# Patient Record
Sex: FEMALE | URBAN - METROPOLITAN AREA
[De-identification: names, ages, dates, MRNs, and addresses within clinical notes are randomized per-mention and may not be internally consistent; named-entity substitution may affect disease eponyms.]

---

## 2018-05-10 ENCOUNTER — APPOINTMENT (RX ONLY)
Dept: URBAN - METROPOLITAN AREA CLINIC 17 | Facility: CLINIC | Age: 83
Setting detail: DERMATOLOGY
End: 2018-05-10

## 2018-05-10 DIAGNOSIS — L03.01 CELLULITIS OF FINGER: ICD-10-CM

## 2018-05-10 PROBLEM — L03.012 CELLULITIS OF LEFT FINGER: Status: ACTIVE | Noted: 2018-05-10

## 2018-05-10 PROCEDURE — ? COUNSELING

## 2018-05-10 PROCEDURE — 11900 INJECT SKIN LESIONS </W 7: CPT

## 2018-05-10 PROCEDURE — ? ADDITIONAL NOTES

## 2018-05-10 PROCEDURE — ? PRESCRIPTION

## 2018-05-10 PROCEDURE — ? INTRALESIONAL KENALOG

## 2018-05-10 RX ORDER — CEPHALEXIN 500 MG/1
TABLET ORAL TID
Qty: 30 | Refills: 0 | COMMUNITY
Start: 2018-05-10

## 2018-05-10 RX ADMIN — CEPHALEXIN: 500 TABLET ORAL at 17:03

## 2018-05-10 ASSESSMENT — LOCATION SIMPLE DESCRIPTION DERM: LOCATION SIMPLE: LEFT RING FINGER

## 2018-05-10 ASSESSMENT — LOCATION DETAILED DESCRIPTION DERM: LOCATION DETAILED: LEFT DISTAL PALMAR RING FINGER

## 2018-05-10 ASSESSMENT — LOCATION ZONE DERM: LOCATION ZONE: FINGER

## 2018-05-10 NOTE — PROCEDURE: INTRALESIONAL KENALOG
Total Volume Injected (Ccs- Only Use Numbers And Decimals): .3
Detail Level: Detailed
Concentration Of Solution Injected (Mg/Ml): 5.0
Consent: The risks of atrophy, color change, striae were reviewed with the patient.
Kenalog Preparation: Kenalog
Medical Necessity Clause: This procedure was medically necessary because the lesions that were treated were: [facility] l[facility] care
X Size Of Lesion In Cm (Optional): 0
Include Z78.9 (Other Specified Conditions Influencing Health Status) As An Associated Diagnosis?: No

## 2018-05-22 ENCOUNTER — APPOINTMENT (RX ONLY)
Dept: URBAN - METROPOLITAN AREA CLINIC 17 | Facility: CLINIC | Age: 83
Setting detail: DERMATOLOGY
End: 2018-05-22

## 2018-05-22 DIAGNOSIS — L03.01 CELLULITIS OF FINGER: ICD-10-CM

## 2018-05-22 PROBLEM — L03.012 CELLULITIS OF LEFT FINGER: Status: ACTIVE | Noted: 2018-05-22

## 2018-05-22 PROCEDURE — 10060 I&D ABSCESS SIMPLE/SINGLE: CPT

## 2018-05-22 PROCEDURE — ? COUNSELING

## 2018-05-22 PROCEDURE — ? PRESCRIPTION

## 2018-05-22 PROCEDURE — ? ADDITIONAL NOTES

## 2018-05-22 PROCEDURE — ? INCISION AND DRAINAGE

## 2018-05-22 RX ORDER — CEPHALEXIN 500 MG/1
TABLET ORAL TID
Qty: 21 | Refills: 0 | Status: CANCELLED
Stop reason: CLARIF

## 2018-05-22 ASSESSMENT — LOCATION DETAILED DESCRIPTION DERM: LOCATION DETAILED: LEFT MIDDLE FINGER DISTAL INTERPHALANGEAL JOINT

## 2018-05-22 ASSESSMENT — LOCATION SIMPLE DESCRIPTION DERM: LOCATION SIMPLE: LEFT MIDDLE FINGER

## 2018-05-22 ASSESSMENT — LOCATION ZONE DERM: LOCATION ZONE: FINGER

## 2018-05-22 NOTE — PROCEDURE: INCISION AND DRAINAGE
Detail Level: Detailed
Render Postcare In Note?: No
Preparation Text: The area was prepped in the usual clean fashion.
Post-Care Instructions: I reviewed with the patient in detail post-care instructions. Patient should keep wound covered and call the office should any redness, pain, swelling or worsening occur.
Size Of Lesion In Cm (Optional But May Be Required For Some Insurances): 0
Consent was obtained and risks were reviewed including but not limited to delayed wound healing, infection, need for multiple I and D's, and pain.
Anesthesia Type: 1% lidocaine without epinephrine
Epidermal Closure: simple interrupted
Epidermal Sutures: 4-0 Ethilon
Anesthesia Volume In Cc: -
Method: 11 blade
Dressing: dry sterile dressing
Wound Care: Petrolatum
Lesion Type: Abscess
Curette Text (Optional): After the contents were expressed a curette was used to partially remove the cyst wall.
Suture Text: The incision was partially closed with

## 2019-06-10 NOTE — HPI: SKIN LESIONS
09-Jun-2019 14:03
How Severe Is Your Skin Lesion?: severe
Have Your Skin Lesions Been Treated?: not been treated
Is This A New Presentation, Or A Follow-Up?: Skin Lesion